# Patient Record
Sex: MALE | Race: OTHER | URBAN - METROPOLITAN AREA
[De-identification: names, ages, dates, MRNs, and addresses within clinical notes are randomized per-mention and may not be internally consistent; named-entity substitution may affect disease eponyms.]

---

## 2024-04-19 ENCOUNTER — OFFICE VISIT (OUTPATIENT)
Dept: URGENT CARE | Age: 49
End: 2024-04-19
Payer: COMMERCIAL

## 2024-04-19 VITALS
RESPIRATION RATE: 18 BRPM | BODY MASS INDEX: 39.4 KG/M2 | OXYGEN SATURATION: 97 % | SYSTOLIC BLOOD PRESSURE: 158 MMHG | DIASTOLIC BLOOD PRESSURE: 98 MMHG | TEMPERATURE: 98.2 F | HEIGHT: 64 IN | HEART RATE: 91 BPM | WEIGHT: 230.8 LBS

## 2024-04-19 DIAGNOSIS — M54.6 ACUTE RIGHT-SIDED THORACIC BACK PAIN: Primary | ICD-10-CM

## 2024-04-19 PROCEDURE — 99213 OFFICE O/P EST LOW 20 MIN: CPT | Performed by: EMERGENCY MEDICINE

## 2024-04-19 RX ORDER — METHOCARBAMOL 500 MG/1
500 TABLET, FILM COATED ORAL
Qty: 20 TABLET | Refills: 0 | Status: SHIPPED | OUTPATIENT
Start: 2024-04-19

## 2024-04-19 RX ORDER — SIMVASTATIN 20 MG
20 TABLET ORAL
COMMUNITY
Start: 2024-02-07 | End: 2024-04-25 | Stop reason: SDUPTHER

## 2024-04-19 RX ORDER — IBUPROFEN 600 MG/1
600 TABLET ORAL EVERY 6 HOURS PRN
Qty: 30 TABLET | Refills: 0 | Status: SHIPPED | OUTPATIENT
Start: 2024-04-19

## 2024-04-19 RX ORDER — LISINOPRIL 40 MG/1
40 TABLET ORAL DAILY
COMMUNITY
End: 2024-04-25 | Stop reason: SDUPTHER

## 2024-04-19 RX ORDER — HYDROCHLOROTHIAZIDE 25 MG/1
25 TABLET ORAL DAILY
COMMUNITY
End: 2024-04-25 | Stop reason: SDUPTHER

## 2024-04-19 RX ORDER — LIDOCAINE 50 MG/G
1 PATCH TOPICAL DAILY
Qty: 10 PATCH | Refills: 0 | Status: SHIPPED | OUTPATIENT
Start: 2024-04-19

## 2024-04-19 NOTE — PROGRESS NOTES
"[facility]        NAME: Michell Landrum is a 49 y.o. male  : 1975    MRN: [id number]  DATE: 2024  TIME: 11:50 AM    /98   Pulse 91   Temp 98.2 °F (36.8 °C) (Tympanic)   Resp 18   Ht 5' 4\" (1.626 m)   Wt 105 kg (230 lb 12.8 oz)   SpO2 97%   BMI 39.62 kg/m²     Assessment and Plan   Acute right-sided thoracic back pain [M54.6]  1. Acute right-sided thoracic back pain  ibuprofen (MOTRIN) 600 mg tablet    methocarbamol (ROBAXIN) 500 mg tablet    lidocaine (Lidoderm) 5 %            Patient Instructions       Follow up with PCP in 3-5 days.  Proceed to  ER if symptoms worsen.    Chief Complaint     Chief Complaint   Patient presents with    Back Pain     Patient reports that he woke up 5 days ago to right side middle back pain, states that it is getting worse.          History of Present Illness       Pt with mid right back pain for 4-5 days     Back Pain        Review of Systems   Review of Systems   Constitutional: Negative.    HENT: Negative.     Eyes: Negative.    Respiratory: Negative.     Cardiovascular: Negative.    Gastrointestinal: Negative.    Endocrine: Negative.    Genitourinary: Negative.    Musculoskeletal:  Positive for back pain.   Skin: Negative.    Allergic/Immunologic: Negative.    Neurological: Negative.    Hematological: Negative.    Psychiatric/Behavioral: Negative.     All other systems reviewed and are negative.        Current Medications       Current Outpatient Medications:     hydroCHLOROthiazide 25 mg tablet, Take 25 mg by mouth daily, Disp: , Rfl:     ibuprofen (MOTRIN) 600 mg tablet, Take 1 tablet (600 mg total) by mouth every 6 (six) hours as needed for mild pain, Disp: 30 tablet, Rfl: 0    lidocaine (Lidoderm) 5 %, Apply 1 patch topically over 12 hours daily Remove & Discard patch within 12 hours or as directed by MD, Disp: 10 patch, Rfl: 0    lisinopril (ZESTRIL) 40 mg tablet, Take 40 mg by mouth daily, Disp: , Rfl:     methocarbamol (ROBAXIN) 500 mg " "tablet, Take 1 tablet (500 mg total) by mouth daily at bedtime, Disp: 20 tablet, Rfl: 0    simvastatin (ZOCOR) 20 mg tablet, Take 20 mg by mouth daily at bedtime, Disp: , Rfl:     Current Allergies     Allergies as of 04/19/2024    (No Known Allergies)            The following portions of the patient's history were reviewed and updated as appropriate: allergies, current medications, past family history, past medical history, past social history, past surgical history and problem list.     No past medical history on file.    No past surgical history on file.    No family history on file.      Medications have been verified.        Objective   /98   Pulse 91   Temp 98.2 °F (36.8 °C) (Tympanic)   Resp 18   Ht 5' 4\" (1.626 m)   Wt 105 kg (230 lb 12.8 oz)   SpO2 97%   BMI 39.62 kg/m²        Physical Exam     Physical Exam  Vitals and nursing note reviewed.   Constitutional:       Appearance: Normal appearance. He is normal weight.      Comments: Pain worse with moving and deep breath and twisting  list of [facility] doctors given   HENT:      Head: Normocephalic and atraumatic.   Cardiovascular:      Rate and Rhythm: Normal rate and regular rhythm.      Pulses: Normal pulses.      Heart sounds: Normal heart sounds.   Pulmonary:      Effort: Pulmonary effort is normal.      Breath sounds: Normal breath sounds.   Abdominal:      General: Abdomen is flat.      Palpations: Abdomen is soft.      Comments: No ruq no flank pain    Musculoskeletal:         General: Normal range of motion.      Cervical back: Normal range of motion and neck supple.      Comments: Right para thoracic tenderness no vertebral pain no cva pain no trapezius pain    Skin:     General: Skin is warm.      Capillary Refill: Capillary refill takes less than 2 seconds.   Neurological:      Mental Status: He is alert and oriented to person, place, and time.                     "

## 2024-04-19 NOTE — LETTER
April 19, 2024     Patient: Michell Landrum   YOB: 1975   Date of Visit: 4/19/2024       To Whom It May Concern:  4  It is my medical opinion that Michell Landrum may return to work on 4/22/2024 .    If you have any questions or concerns, please don't hesitate to call.         Sincerely,        Piter Snyder Jr, MICKY    CC: No Recipients

## 2024-04-25 ENCOUNTER — OFFICE VISIT (OUTPATIENT)
Dept: FAMILY MEDICINE CLINIC | Facility: CLINIC | Age: 49
End: 2024-04-25
Payer: COMMERCIAL

## 2024-04-25 VITALS
TEMPERATURE: 98.2 F | OXYGEN SATURATION: 97 % | HEART RATE: 90 BPM | DIASTOLIC BLOOD PRESSURE: 84 MMHG | SYSTOLIC BLOOD PRESSURE: 148 MMHG | RESPIRATION RATE: 16 BRPM | WEIGHT: 230 LBS | BODY MASS INDEX: 39.48 KG/M2

## 2024-04-25 DIAGNOSIS — E78.5 HYPERLIPIDEMIA, UNSPECIFIED HYPERLIPIDEMIA TYPE: ICD-10-CM

## 2024-04-25 DIAGNOSIS — Z11.59 NEED FOR HEPATITIS C SCREENING TEST: ICD-10-CM

## 2024-04-25 DIAGNOSIS — Z12.5 PROSTATE CANCER SCREENING: ICD-10-CM

## 2024-04-25 DIAGNOSIS — Z23 ENCOUNTER FOR IMMUNIZATION: Primary | ICD-10-CM

## 2024-04-25 DIAGNOSIS — Z12.11 COLON CANCER SCREENING: ICD-10-CM

## 2024-04-25 DIAGNOSIS — M77.11 LATERAL EPICONDYLITIS OF RIGHT ELBOW: ICD-10-CM

## 2024-04-25 DIAGNOSIS — I10 PRIMARY HYPERTENSION: ICD-10-CM

## 2024-04-25 DIAGNOSIS — Z00.00 ANNUAL PHYSICAL EXAM: ICD-10-CM

## 2024-04-25 DIAGNOSIS — Z11.4 SCREENING FOR HIV (HUMAN IMMUNODEFICIENCY VIRUS): ICD-10-CM

## 2024-04-25 DIAGNOSIS — H40.9 GLAUCOMA, UNSPECIFIED GLAUCOMA TYPE, UNSPECIFIED LATERALITY: ICD-10-CM

## 2024-04-25 PROCEDURE — 90750 HZV VACC RECOMBINANT IM: CPT

## 2024-04-25 PROCEDURE — 99386 PREV VISIT NEW AGE 40-64: CPT | Performed by: FAMILY MEDICINE

## 2024-04-25 PROCEDURE — 90471 IMMUNIZATION ADMIN: CPT

## 2024-04-25 RX ORDER — HYDROCHLOROTHIAZIDE 25 MG/1
25 TABLET ORAL DAILY
Qty: 90 TABLET | Refills: 1 | Status: SHIPPED | OUTPATIENT
Start: 2024-04-25

## 2024-04-25 RX ORDER — AMLODIPINE BESYLATE 5 MG/1
5 TABLET ORAL DAILY
Qty: 90 TABLET | Refills: 1 | Status: SHIPPED | OUTPATIENT
Start: 2024-04-25

## 2024-04-25 RX ORDER — NETARSUDIL 0.2 MG/ML
1 SOLUTION/ DROPS OPHTHALMIC; TOPICAL DAILY
COMMUNITY
Start: 2023-09-12 | End: 2024-09-11

## 2024-04-25 RX ORDER — LISINOPRIL 40 MG/1
40 TABLET ORAL DAILY
Qty: 90 TABLET | Refills: 1 | Status: SHIPPED | OUTPATIENT
Start: 2024-04-25

## 2024-04-25 RX ORDER — SIMVASTATIN 20 MG
20 TABLET ORAL
Qty: 90 TABLET | Refills: 1 | Status: SHIPPED | OUTPATIENT
Start: 2024-04-25

## 2024-04-25 NOTE — PROGRESS NOTES
ADULT ANNUAL PHYSICAL  [facility]    NAME: Michell Landrum  AGE: 49 y.o. SEX: male  : 1975     DATE: 2024     Assessment and Plan:     Problem List Items Addressed This Visit          Cardiovascular and Mediastinum    Primary hypertension     Elevated on exam today, add amlodipine follow up 3 months, call with home results         Relevant Medications    lisinopril (ZESTRIL) 40 mg tablet    hydroCHLOROthiazide 25 mg tablet    amLODIPine (NORVASC) 5 mg tablet       Musculoskeletal and Integument    Lateral epicondylitis of right elbow     Given home exercises, follow up if not resolved            Eye    Glaucoma    Relevant Medications    Netarsudil Dimesylate (Rhopressa) 0.02 % SOLN    Other Relevant Orders    Ambulatory Referral to Ophthalmology       Other    Colon cancer screening     Fit testing reviewed, discussed screening options proceed with colonoscopy referall placed.         Relevant Orders    Ambulatory Referral to Gastroenterology     Other Visit Diagnoses       Encounter for immunization    -  Primary    Relevant Orders    Zoster Vaccine Recombinant IM (Completed)    Annual physical exam        Relevant Orders    Comprehensive metabolic panel    Lipid panel    Hemoglobin A1C    Need for hepatitis C screening test        Relevant Orders    Hepatitis C Antibody    Screening for HIV (human immunodeficiency virus)        Relevant Orders    HIV 1/2 AG/AB w Reflex [facility] for 2 yr old and above    Hyperlipidemia, unspecified hyperlipidemia type        Relevant Medications    simvastatin (ZOCOR) 20 mg tablet    Prostate cancer screening        Relevant Orders    PSA, Total Screen            Immunizations and preventive care screenings were discussed with patient today. Appropriate education was printed on patient's after visit summary.    Discussed risks and benefits of prostate cancer screening. We discussed the controversial history of  PSA screening for prostate cancer in the [place] as well as the risk of over detection and over treatment of prostate cancer by way of PSA screening.  The patient understands that PSA blood testing is an imperfect way to screen for prostate cancer and that elevated PSA levels in the blood may also be caused by infection, inflammation, prostatic trauma or manipulation, urological procedures, or by benign prostatic enlargement.    The role of the digital rectal examination in prostate cancer screening was also discussed and I discussed with him that there is large interobserver variability in the findings of digital rectal examination.    Counseling:  Alcohol/drug use: discussed moderation in alcohol intake, the recommendations for healthy alcohol use, and avoidance of illicit drug use.  Dental Health: discussed importance of regular tooth brushing, flossing, and dental visits.  Injury prevention: discussed safety/seat belts, safety helmets, smoke detectors, carbon dioxide detectors, and smoking near bedding or upholstery.  Sexual health: discussed sexually transmitted diseases, partner selection, use of condoms, avoidance of unintended pregnancy, and contraceptive alternatives.  Exercise: the importance of regular exercise/physical activity was discussed. Recommend exercise 3-5 times per week for at least 30 minutes.          Return in about 3 months (around 7/25/2024).     Chief Complaint:     Chief Complaint   Patient presents with    Physical Exam    Establish Care      History of Present Illness:     Adult Annual Physical   Patient here for a comprehensive physical exam. The patient reports problems - as above .    Diet and Physical Activity  Diet/Nutrition: well balanced diet.   Exercise: walking.      Depression Screening  PHQ-2/9 Depression Screening    Little interest or pleasure in doing things: 0 - not at all  Feeling down, depressed, or hopeless: 0 - not at all  Trouble falling or staying asleep, or  sleeping too much: 0 - not at all  Feeling tired or having little energy: 0 - not at all  Poor appetite or overeatin - not at all  Feeling bad about yourself - or that you are a failure or have let yourself or your family down: 0 - not at all  Trouble concentrating on things, such as reading the newspaper or watching television: 0 - not at all  Moving or speaking so slowly that other people could have noticed. Or the opposite - being so fidgety or restless that you have been moving around a lot more than usual: 0 - not at all  Thoughts that you would be better off dead, or of hurting yourself in some way: 0 - not at all  PHQ-2 Score: 0  PHQ-2 Interpretation: Negative depression screen  PHQ-9 Score: 0  PHQ-9 Interpretation: No or Minimal depression       General Health  Sleep: sleeps well.   Hearing: normal - bilateral.  Vision: no vision problems.   Dental: regular dental visits.        Health  Symptoms include: none     Review of Systems:     Review of Systems   Constitutional:  Negative for activity change.   Respiratory:  Negative for apnea and chest tightness.    Cardiovascular:  Negative for chest pain and palpitations.   Gastrointestinal:  Negative for abdominal distention and abdominal pain.   Musculoskeletal:  Negative for arthralgias and back pain.      Past Medical History:     Past Medical History:   Diagnosis Date    Glaucoma       Past Surgical History:     History reviewed. No pertinent surgical history.   Family History:     Family History   Problem Relation Age of Onset    Diabetes Mother     Diabetes Maternal Grandmother       Social History:     Social History     Socioeconomic History    Marital status: /Civil Union     Spouse name: None    Number of children: None    Years of education: None    Highest education level: None   Occupational History    None   Tobacco Use    Smoking status: Never    Smokeless tobacco: Never   Vaping Use    Vaping status: Never Used   Substance and Sexual  Activity    Alcohol use: Never    Drug use: Never    Sexual activity: None   Other Topics Concern    None   Social History Narrative    None     Social Determinants of Health     Financial Resource Strain: Not on file   Food Insecurity: Not on file   Transportation Needs: Not on file   Physical Activity: Not on file   Stress: Not on file   Social Connections: Not on file   Intimate Partner Violence: Not on file   Housing Stability: Not on file      Current Medications:     Current Outpatient Medications   Medication Sig Dispense Refill    amLODIPine (NORVASC) 5 mg tablet Take 1 tablet (5 mg total) by mouth daily 90 tablet 1    hydroCHLOROthiazide 25 mg tablet Take 1 tablet (25 mg total) by mouth daily 90 tablet 1    ibuprofen (MOTRIN) 600 mg tablet Take 1 tablet (600 mg total) by mouth every 6 (six) hours as needed for mild pain 30 tablet 0    lidocaine (Lidoderm) 5 % Apply 1 patch topically over 12 hours daily Remove & Discard patch within 12 hours or as directed by MD 10 patch 0    lisinopril (ZESTRIL) 40 mg tablet Take 1 tablet (40 mg total) by mouth daily 90 tablet 1    methocarbamol (ROBAXIN) 500 mg tablet Take 1 tablet (500 mg total) by mouth daily at bedtime 20 tablet 0    Netarsudil Dimesylate (Rhopressa) 0.02 % SOLN Apply 1 drop to eye daily      simvastatin (ZOCOR) 20 mg tablet Take 1 tablet (20 mg total) by mouth daily at bedtime 90 tablet 1     No current facility-administered medications for this visit.      Allergies:     Allergies   Allergen Reactions    Gramineae Pollens Cough      Physical Exam:     /84 (BP Location: Left arm, Patient Position: Sitting, Cuff Size: Large)   Pulse 90   Temp 98.2 °F (36.8 °C) (Tympanic)   Resp 16   Wt 104 kg (230 lb)   SpO2 97%   BMI 39.48 kg/m²     Physical Exam  Constitutional:       Appearance: Normal appearance.   Cardiovascular:      Rate and Rhythm: Normal rate and regular rhythm.      Pulses: Normal pulses.      Heart sounds: Normal heart sounds.    Pulmonary:      Effort: Pulmonary effort is normal.      Breath sounds: Normal breath sounds.   Abdominal:      General: Abdomen is flat.   Musculoskeletal:         General: Normal range of motion.   Neurological:      General: No focal deficit present.      Mental Status: He is alert and oriented to person, place, and time.          Bobby Stockton MD  [facility]

## 2024-04-26 ENCOUNTER — APPOINTMENT (OUTPATIENT)
Dept: LAB | Facility: MEDICAL CENTER | Age: 49
End: 2024-04-26
Payer: COMMERCIAL

## 2024-04-26 DIAGNOSIS — Z12.5 PROSTATE CANCER SCREENING: ICD-10-CM

## 2024-04-26 DIAGNOSIS — Z11.59 NEED FOR HEPATITIS C SCREENING TEST: ICD-10-CM

## 2024-04-26 DIAGNOSIS — Z00.8 HEALTH EXAMINATION IN POPULATION SURVEY: ICD-10-CM

## 2024-04-26 DIAGNOSIS — Z11.4 SCREENING FOR HIV (HUMAN IMMUNODEFICIENCY VIRUS): ICD-10-CM

## 2024-04-26 DIAGNOSIS — Z00.00 ANNUAL PHYSICAL EXAM: ICD-10-CM

## 2024-04-26 LAB
ALBUMIN SERPL BCP-MCNC: 4.3 G/DL (ref 3.5–5)
ALP SERPL-CCNC: 43 U/L (ref 34–104)
ALT SERPL W P-5'-P-CCNC: 38 U/L (ref 7–52)
ANION GAP SERPL CALCULATED.3IONS-SCNC: 7 MMOL/L (ref 4–13)
AST SERPL W P-5'-P-CCNC: 22 U/L (ref 13–39)
BILIRUB SERPL-MCNC: 0.6 MG/DL (ref 0.2–1)
BUN SERPL-MCNC: 20 MG/DL (ref 5–25)
CALCIUM SERPL-MCNC: 9.3 MG/DL (ref 8.4–10.2)
CHLORIDE SERPL-SCNC: 103 MMOL/L (ref 96–108)
CHOLEST SERPL-MCNC: 139 MG/DL
CO2 SERPL-SCNC: 31 MMOL/L (ref 21–32)
CREAT SERPL-MCNC: 1 MG/DL (ref 0.6–1.3)
EST. AVERAGE GLUCOSE BLD GHB EST-MCNC: 123 MG/DL
GFR SERPL CREATININE-BSD FRML MDRD: 86 ML/MIN/1.73SQ M
GLUCOSE P FAST SERPL-MCNC: 95 MG/DL (ref 65–99)
HBA1C MFR BLD: 5.9 %
HCV AB SER QL: NORMAL
HDLC SERPL-MCNC: 46 MG/DL
HIV 1+2 AB+HIV1 P24 AG SERPL QL IA: NORMAL
HIV 2 AB SERPL QL IA: NORMAL
HIV1 AB SERPL QL IA: NORMAL
HIV1 P24 AG SERPL QL IA: NORMAL
LDLC SERPL CALC-MCNC: 77 MG/DL (ref 0–100)
NONHDLC SERPL-MCNC: 93 MG/DL
POTASSIUM SERPL-SCNC: 3.8 MMOL/L (ref 3.5–5.3)
PROT SERPL-MCNC: 7.2 G/DL (ref 6.4–8.4)
PSA SERPL-MCNC: 0.4 NG/ML (ref 0–4)
SODIUM SERPL-SCNC: 141 MMOL/L (ref 135–147)
TRIGL SERPL-MCNC: 81 MG/DL

## 2024-04-26 PROCEDURE — 83036 HEMOGLOBIN GLYCOSYLATED A1C: CPT

## 2024-04-26 PROCEDURE — 80053 COMPREHEN METABOLIC PANEL: CPT

## 2024-04-26 PROCEDURE — 87389 HIV-1 AG W/HIV-1&-2 AB AG IA: CPT

## 2024-04-26 PROCEDURE — 86803 HEPATITIS C AB TEST: CPT

## 2024-04-26 PROCEDURE — 36415 COLL VENOUS BLD VENIPUNCTURE: CPT

## 2024-04-26 PROCEDURE — 80061 LIPID PANEL: CPT

## 2024-04-26 PROCEDURE — G0103 PSA SCREENING: HCPCS

## 2024-05-12 RX ORDER — NETARSUDIL 0.2 MG/ML
1 SOLUTION/ DROPS OPHTHALMIC; TOPICAL DAILY
Qty: 1.5 ML | Refills: 0 | Status: CANCELLED | OUTPATIENT
Start: 2024-05-12 | End: 2025-05-12

## 2024-05-13 ENCOUNTER — TELEPHONE (OUTPATIENT)
Dept: FAMILY MEDICINE CLINIC | Facility: CLINIC | Age: 49
End: 2024-05-13

## 2024-05-13 DIAGNOSIS — H40.10X0 OPEN-ANGLE GLAUCOMA OF RIGHT EYE, UNSPECIFIED GLAUCOMA STAGE, UNSPECIFIED OPEN-ANGLE GLAUCOMA TYPE: Primary | ICD-10-CM

## 2024-05-13 NOTE — TELEPHONE ENCOUNTER
Medication Refill Request     Name   Netarsudil Dimesylate (Rhopressa) 0.02 % SOLN     Dose/Frequency Apply 1 drop to eye daily   Quantity --  Verified pharmacy   [x]  Verified ordering Provider   [x]  Does patient have enough for the next 3 days? Yes [] No [x]

## 2024-05-13 NOTE — TELEPHONE ENCOUNTER
I called pt l/m to advise him to call ophthalmologist for a refill on his medication. He is to call with questions or concerns.

## 2024-05-13 NOTE — TELEPHONE ENCOUNTER
I called pt l/m per Dr. Stockton see below.       Please call patient and have him call his opthalmologist for a refill on Netarsudil Dimesylate 0.02 % SOLN.

## 2024-05-14 RX ORDER — NETARSUDIL 0.2 MG/ML
1 SOLUTION/ DROPS OPHTHALMIC; TOPICAL DAILY
Qty: 1.5 ML | Refills: 5 | Status: SHIPPED | OUTPATIENT
Start: 2024-05-14 | End: 2025-05-14

## 2024-05-17 ENCOUNTER — TELEPHONE (OUTPATIENT)
Dept: ADMINISTRATIVE | Facility: OTHER | Age: 49
End: 2024-05-17

## 2024-05-17 NOTE — TELEPHONE ENCOUNTER
Upon review of the In Basket request we were able to locate, review, and update the patient chart as requested for CRC: FIT/FOBT (3).    Any additional questions or concerns should be emailed to the Practice Liaisons via the appropriate education email address, please do not reply via In Basket.    Thank you  Charlene Kendrick

## 2024-05-17 NOTE — TELEPHONE ENCOUNTER
----- Message from Nica VALENTINE sent at 5/17/2024 11:21 AM EDT -----  Regarding: care gap request  05/17/24 11:21 AM    Hello, our patient attached above has had CRC: FIT/FOBTx3 completed/performed. Please assist in updating the patient chart by pulling the Care Everywhere (CE) document. The date of service is 6/5/2023.     Thank you,  Nica Brewster PG [facility]

## 2024-05-25 PROBLEM — Z12.11 COLON CANCER SCREENING: Status: RESOLVED | Noted: 2024-04-25 | Resolved: 2024-05-25

## 2024-05-28 ENCOUNTER — TELEPHONE (OUTPATIENT)
Dept: GASTROENTEROLOGY | Facility: CLINIC | Age: 49
End: 2024-05-28

## 2024-05-28 ENCOUNTER — PREP FOR PROCEDURE (OUTPATIENT)
Dept: GASTROENTEROLOGY | Facility: CLINIC | Age: 49
End: 2024-05-28

## 2024-05-28 DIAGNOSIS — Z12.11 SCREENING FOR COLON CANCER: Primary | ICD-10-CM

## 2024-05-28 NOTE — TELEPHONE ENCOUNTER
Scheduled date of colonoscopy (as of today): 07/02/24  Physician performing colonoscopy: lila  Location of colonoscopy: [facility]  Bowel prep reviewed with patient: m/d  Instructions reviewed with patient by: edelmira  Clearances: none

## 2024-05-28 NOTE — TELEPHONE ENCOUNTER
05/28/24  Screened by: Eun Hennessy MA    Referring Provider oa    Pre- Screening:     There is no height or weight on file to calculate BMI.  Has patient been referred for a routine screening Colonoscopy? no  Is the patient between 45-75 years old? no      Previous Colonoscopy no   If yes:    Date:     Facility:     Reason:       SCHEDULING STAFF: If the patient is between 45yrs-49yrs, please advise patient to confirm benefits/coverage with their insurance company for a routine screening colonoscopy, some insurance carriers will only cover at 50yrs or older. If the patient is over 75years old, please schedule an office visit.     Does the patient want to see a Gastroenterologist prior to their procedure OR are they having any GI symptoms?     Has the patient been hospitalized or had abdominal surgery in the past 6 months? no    Does the patient use supplemental oxygen? no    Does the patient take Coumadin, Lovenox, Plavix, Elliquis, Xarelto, or other blood thinning medication? no    Has the patient had a stroke, cardiac event, or stent placed in the past year? no    SCHEDULING STAFF: If patient answers NO to above questions, then schedule procedure. If patient answers YES to above questions, then schedule office appointment.     If patient is between 45yrs - 49yrs, please advise patient that we will have to confirm benefits & coverage with their insurance company for a routine screening colonoscopy.

## 2024-06-17 ENCOUNTER — ANESTHESIA EVENT (OUTPATIENT)
Dept: ANESTHESIOLOGY | Facility: HOSPITAL | Age: 49
End: 2024-06-17

## 2024-06-17 ENCOUNTER — ANESTHESIA (OUTPATIENT)
Dept: ANESTHESIOLOGY | Facility: HOSPITAL | Age: 49
End: 2024-06-17

## 2024-07-02 ENCOUNTER — ANESTHESIA EVENT (OUTPATIENT)
Dept: GASTROENTEROLOGY | Facility: MEDICAL CENTER | Age: 49
End: 2024-07-02

## 2024-07-02 ENCOUNTER — ANESTHESIA (OUTPATIENT)
Dept: GASTROENTEROLOGY | Facility: MEDICAL CENTER | Age: 49
End: 2024-07-02

## 2024-07-02 ENCOUNTER — HOSPITAL ENCOUNTER (OUTPATIENT)
Dept: GASTROENTEROLOGY | Facility: MEDICAL CENTER | Age: 49
Setting detail: OUTPATIENT SURGERY
Discharge: HOME | End: 2024-07-02
Payer: COMMERCIAL

## 2024-07-02 VITALS
WEIGHT: 230 LBS | RESPIRATION RATE: 18 BRPM | DIASTOLIC BLOOD PRESSURE: 57 MMHG | BODY MASS INDEX: 39.27 KG/M2 | SYSTOLIC BLOOD PRESSURE: 127 MMHG | OXYGEN SATURATION: 95 % | TEMPERATURE: 98.2 F | HEIGHT: 64 IN | HEART RATE: 88 BPM

## 2024-07-02 DIAGNOSIS — Z12.11 SCREENING FOR COLON CANCER: ICD-10-CM

## 2024-07-02 PROCEDURE — 45385 COLONOSCOPY W/LESION REMOVAL: CPT | Performed by: STUDENT IN AN ORGANIZED HEALTH CARE EDUCATION/TRAINING PROGRAM

## 2024-07-02 PROCEDURE — 88305 TISSUE EXAM BY PATHOLOGIST: CPT | Performed by: PATHOLOGY

## 2024-07-02 RX ORDER — SODIUM CHLORIDE 9 MG/ML
75 INJECTION, SOLUTION INTRAVENOUS CONTINUOUS
Status: DISCONTINUED | OUTPATIENT
Start: 2024-07-02 | End: 2024-07-06 | Stop reason: HOSPADM

## 2024-07-02 RX ORDER — SODIUM CHLORIDE 9 MG/ML
75 INJECTION, SOLUTION INTRAVENOUS CONTINUOUS
Status: CANCELLED | OUTPATIENT
Start: 2024-07-02

## 2024-07-02 RX ORDER — SIMETHICONE 40MG/0.6ML
SUSPENSION, DROPS(FINAL DOSAGE FORM)(ML) ORAL AS NEEDED
Status: COMPLETED | OUTPATIENT
Start: 2024-07-02 | End: 2024-07-02

## 2024-07-02 RX ORDER — PROPOFOL 10 MG/ML
INJECTION, EMULSION INTRAVENOUS AS NEEDED
Status: DISCONTINUED | OUTPATIENT
Start: 2024-07-02 | End: 2024-07-02

## 2024-07-02 RX ADMIN — PROPOFOL 20 MG: 10 INJECTION, EMULSION INTRAVENOUS at 10:42

## 2024-07-02 RX ADMIN — PROPOFOL 20 MG: 10 INJECTION, EMULSION INTRAVENOUS at 10:39

## 2024-07-02 RX ADMIN — PROPOFOL 80 MG: 10 INJECTION, EMULSION INTRAVENOUS at 10:26

## 2024-07-02 RX ADMIN — SODIUM CHLORIDE 75 ML/HR: 0.9 INJECTION, SOLUTION INTRAVENOUS at 10:14

## 2024-07-02 RX ADMIN — PROPOFOL 20 MG: 10 INJECTION, EMULSION INTRAVENOUS at 10:30

## 2024-07-02 RX ADMIN — PROPOFOL 20 MG: 10 INJECTION, EMULSION INTRAVENOUS at 10:36

## 2024-07-02 RX ADMIN — PROPOFOL 20 MG: 10 INJECTION, EMULSION INTRAVENOUS at 10:40

## 2024-07-02 RX ADMIN — Medication 40 MG: at 10:29

## 2024-07-02 RX ADMIN — PROPOFOL 20 MG: 10 INJECTION, EMULSION INTRAVENOUS at 10:44

## 2024-07-02 RX ADMIN — PROPOFOL 20 MG: 10 INJECTION, EMULSION INTRAVENOUS at 10:32

## 2024-07-02 RX ADMIN — PROPOFOL 20 MG: 10 INJECTION, EMULSION INTRAVENOUS at 10:29

## 2024-07-02 NOTE — H&P
History and Physical - [facility]  Michell Landrum 49 y.o. male MRN: [id number]          HPI: Michell Landrum is a 49 y.o. year old male who presents for open access screening colonoscopy. No family history of colon cancer..      REVIEW OF SYSTEMS: Per the HPI, and otherwise unremarkable.    Historical Information   Past Medical History:   Diagnosis Date    Glaucoma      No past surgical history on file.  Social History   Social History     Substance and Sexual Activity   Alcohol Use Never     Social History     Substance and Sexual Activity   Drug Use Never     Social History     Tobacco Use   Smoking Status Never   Smokeless Tobacco Never     Family History   Problem Relation Age of Onset    Diabetes Mother     Diabetes Maternal Grandmother        Meds/Allergies       Current Outpatient Medications:     amLODIPine (NORVASC) 5 mg tablet    hydroCHLOROthiazide 25 mg tablet    ibuprofen (MOTRIN) 600 mg tablet    lidocaine (Lidoderm) 5 %    lisinopril (ZESTRIL) 40 mg tablet    methocarbamol (ROBAXIN) 500 mg tablet    Netarsudil Dimesylate (Rhopressa) 0.02 % SOLN    simvastatin (ZOCOR) 20 mg tablet    Allergies   Allergen Reactions    Gramineae Pollens Cough       Objective     There were no vitals taken for this visit.      PHYSICAL EXAM    GEN: NAD  CARDIO: RRR  PULM: CTA bilaterally  ABD: soft, non-tender, non-distended  EXT: no lower extremity edema  NEURO: AAOx3      ASSESSMENT/PLAN:  49 y.o. year old male here for colonoscopy; he is stable and optimized for his procedure.

## 2024-07-02 NOTE — ANESTHESIA PREPROCEDURE EVALUATION
Procedure:  COLONOSCOPY    Relevant Problems   CARDIO   (+) Primary hypertension      MUSCULOSKELETAL   (+) Lateral epicondylitis of right elbow        Physical Exam    Airway    Mallampati score: III  TM Distance: >3 FB  Neck ROM: full     Dental   No notable dental hx     Cardiovascular  Cardiovascular exam normal    Pulmonary  Pulmonary exam normal     Other Findings        Anesthesia Plan  ASA Score- 2     Anesthesia Type- IV sedation with anesthesia with ASA Monitors.         Additional Monitors:     Airway Plan:            Plan Factors-Exercise tolerance (METS): >4 METS.    Chart reviewed.    Patient summary reviewed.    Patient is not a current smoker.              Induction- intravenous.    Postoperative Plan-         Informed Consent- Anesthetic plan and risks discussed with patient.

## 2024-07-02 NOTE — ANESTHESIA POSTPROCEDURE EVALUATION
Post-Op Assessment Note    CV Status:  Stable    Pain management: adequate       Mental Status:  Sleepy   Hydration Status:  Euvolemic   PONV Controlled:  Controlled   Airway Patency:  Patent     Post Op Vitals Reviewed: Yes    No anethesia notable event occurred.    Staff: CRNA               BP   139/74   Temp   97.6   Pulse  72   Resp   18   SpO2   99

## 2024-07-05 PROCEDURE — 88305 TISSUE EXAM BY PATHOLOGIST: CPT | Performed by: PATHOLOGY

## 2024-07-25 ENCOUNTER — OFFICE VISIT (OUTPATIENT)
Dept: FAMILY MEDICINE CLINIC | Facility: CLINIC | Age: 49
End: 2024-07-25
Payer: COMMERCIAL

## 2024-07-25 VITALS
SYSTOLIC BLOOD PRESSURE: 128 MMHG | TEMPERATURE: 97.9 F | DIASTOLIC BLOOD PRESSURE: 78 MMHG | OXYGEN SATURATION: 97 % | BODY MASS INDEX: 39.48 KG/M2 | WEIGHT: 230 LBS | HEART RATE: 97 BPM

## 2024-07-25 DIAGNOSIS — I10 PRIMARY HYPERTENSION: ICD-10-CM

## 2024-07-25 DIAGNOSIS — M77.11 LATERAL EPICONDYLITIS OF RIGHT ELBOW: ICD-10-CM

## 2024-07-25 DIAGNOSIS — M79.641 PAIN OF RIGHT HAND: Primary | ICD-10-CM

## 2024-07-25 PROCEDURE — 99213 OFFICE O/P EST LOW 20 MIN: CPT | Performed by: FAMILY MEDICINE

## 2024-07-25 NOTE — TELEPHONE ENCOUNTER
Lvm in Nepali letting patient know that his appointment is in [address]  in [place] and not at [facility]. Advice patient to call if he had any questions

## 2024-07-25 NOTE — PROGRESS NOTES
Assessment/Plan:       Problem List Items Addressed This Visit          Cardiovascular and Mediastinum    Primary hypertension     Well controlled, did not take amldipine, continue on hctz and lisinorpil         Relevant Orders    Basic metabolic panel       Musculoskeletal and Integument    Lateral epicondylitis of right elbow     Not imroving with home exercises, working as , refer to orthopedics for further evaluation         Relevant Orders    Ambulatory Referral to Orthopedic Surgery       Surgery/Wound/Pain    Pain of right hand - Primary     Tender in center of hand with no palpable abnormality, refer to orthopedics and check xray         Relevant Orders    XR hand 3+ vw right         Subjective:      Patient ID: Michell Landrum is a 49 y.o. male.    Arm Pain   Pertinent negatives include no chest pain.       49 year old male presnting for follow up of htn, and continued elbow pain.    HTN now well controled.    Elbow pain for last 6 motnhs not improving.    Also having tenderness in center of right hand    The following portions of the patient's history were reviewed and updated as appropriate: allergies, current medications, past family history, past medical history, past social history, past surgical history and problem list.      Current Outpatient Medications:     hydroCHLOROthiazide 25 mg tablet, Take 1 tablet (25 mg total) by mouth daily, Disp: 90 tablet, Rfl: 1    ibuprofen (MOTRIN) 600 mg tablet, Take 1 tablet (600 mg total) by mouth every 6 (six) hours as needed for mild pain, Disp: 30 tablet, Rfl: 0    lidocaine (Lidoderm) 5 %, Apply 1 patch topically over 12 hours daily Remove & Discard patch within 12 hours or as directed by MD, Disp: 10 patch, Rfl: 0    lisinopril (ZESTRIL) 40 mg tablet, Take 1 tablet (40 mg total) by mouth daily, Disp: 90 tablet, Rfl: 1    methocarbamol (ROBAXIN) 500 mg tablet, Take 1 tablet (500 mg total) by mouth daily at bedtime, Disp: 20 tablet, Rfl: 0    Netarsudil  Dimesylate (Rhopressa) 0.02 % SOLN, Apply 1 drop to eye daily, Disp: 1.5 mL, Rfl: 5    simvastatin (ZOCOR) 20 mg tablet, Take 1 tablet (20 mg total) by mouth daily at bedtime, Disp: 90 tablet, Rfl: 1     Review of Systems   Constitutional:  Negative for activity change and appetite change.   Respiratory:  Negative for apnea and chest tightness.    Cardiovascular:  Negative for chest pain and palpitations.   Gastrointestinal:  Negative for abdominal distention and abdominal pain.   Musculoskeletal:  Negative for arthralgias and back pain.         Objective:      /78 (BP Location: Left arm, Cuff Size: Large)   Pulse 97   Temp 97.9 °F (36.6 °C) (Tympanic)   Wt 104 kg (230 lb)   SpO2 97%   BMI 39.48 kg/m²          Physical Exam  Constitutional:       Appearance: Normal appearance.   Cardiovascular:      Rate and Rhythm: Normal rate and regular rhythm.      Pulses: Normal pulses.      Heart sounds: Normal heart sounds.   Musculoskeletal:      Right elbow: Decreased range of motion. Tenderness present.      Left elbow: Normal.      Right hand: Tenderness present. Normal strength.        Arms:    Skin:     Capillary Refill: Capillary refill takes less than 2 seconds.   Neurological:      General: No focal deficit present.      Mental Status: He is alert and oriented to person, place, and time.           Bobby Stockton MD

## 2024-07-30 ENCOUNTER — APPOINTMENT (OUTPATIENT)
Dept: LAB | Facility: HOSPITAL | Age: 49
End: 2024-07-30

## 2024-07-30 DIAGNOSIS — Z00.8 ENCOUNTER FOR OTHER GENERAL EXAMINATION: ICD-10-CM

## 2024-07-30 LAB
CHOLEST SERPL-MCNC: 203 MG/DL
EST. AVERAGE GLUCOSE BLD GHB EST-MCNC: 126 MG/DL
HBA1C MFR BLD: 6 %
HDLC SERPL-MCNC: 47 MG/DL
LDLC SERPL CALC-MCNC: 110 MG/DL (ref 0–100)
NONHDLC SERPL-MCNC: 156 MG/DL
TRIGL SERPL-MCNC: 232 MG/DL

## 2024-07-30 PROCEDURE — 80061 LIPID PANEL: CPT

## 2024-07-30 PROCEDURE — 83036 HEMOGLOBIN GLYCOSYLATED A1C: CPT

## 2024-07-30 PROCEDURE — 36415 COLL VENOUS BLD VENIPUNCTURE: CPT

## 2024-08-01 ENCOUNTER — OFFICE VISIT (OUTPATIENT)
Dept: OBGYN CLINIC | Facility: CLINIC | Age: 49
End: 2024-08-01
Payer: COMMERCIAL

## 2024-08-01 VITALS
DIASTOLIC BLOOD PRESSURE: 80 MMHG | WEIGHT: 229.4 LBS | SYSTOLIC BLOOD PRESSURE: 142 MMHG | HEIGHT: 64 IN | BODY MASS INDEX: 39.16 KG/M2

## 2024-08-01 DIAGNOSIS — M77.11 LATERAL EPICONDYLITIS OF RIGHT ELBOW: ICD-10-CM

## 2024-08-01 DIAGNOSIS — M65.341 TRIGGER FINGER, RIGHT RING FINGER: Primary | ICD-10-CM

## 2024-08-01 PROCEDURE — 20551 NJX 1 TENDON ORIGIN/INSJ: CPT | Performed by: PHYSICIAN ASSISTANT

## 2024-08-01 PROCEDURE — 99203 OFFICE O/P NEW LOW 30 MIN: CPT | Performed by: PHYSICIAN ASSISTANT

## 2024-08-01 PROCEDURE — 20550 NJX 1 TENDON SHEATH/LIGAMENT: CPT | Performed by: PHYSICIAN ASSISTANT

## 2024-08-01 RX ORDER — BETAMETHASONE SODIUM PHOSPHATE AND BETAMETHASONE ACETATE 3; 3 MG/ML; MG/ML
6 INJECTION, SUSPENSION INTRA-ARTICULAR; INTRALESIONAL; INTRAMUSCULAR; SOFT TISSUE
Status: COMPLETED | OUTPATIENT
Start: 2024-08-01 | End: 2024-08-01

## 2024-08-01 RX ORDER — MELOXICAM 15 MG/1
15 TABLET ORAL DAILY
Qty: 30 TABLET | Refills: 0 | Status: SHIPPED | OUTPATIENT
Start: 2024-08-01

## 2024-08-01 RX ORDER — LIDOCAINE HYDROCHLORIDE 10 MG/ML
1 INJECTION, SOLUTION INFILTRATION; PERINEURAL
Status: COMPLETED | OUTPATIENT
Start: 2024-08-01 | End: 2024-08-01

## 2024-08-01 RX ADMIN — BETAMETHASONE SODIUM PHOSPHATE AND BETAMETHASONE ACETATE 6 MG: 3; 3 INJECTION, SUSPENSION INTRA-ARTICULAR; INTRALESIONAL; INTRAMUSCULAR; SOFT TISSUE at 11:00

## 2024-08-01 RX ADMIN — LIDOCAINE HYDROCHLORIDE 1 ML: 10 INJECTION, SOLUTION INFILTRATION; PERINEURAL at 11:00

## 2024-08-01 NOTE — PROGRESS NOTES
"Patient Name:  Michell Landrum  MRN:  [id number]    Assessment & Plan     Right elbow lateral epicondylitis.  Right ring finger pain, likely early onset trigger finger.  Patient was offered and accepted a right elbow common extensor origin corticosteroid injection today.  Patient was also offered and accepted a right ring finger A1 pulley corticosteroid injection today.  Prescription for meloxicam.  Referral to physical therapy.  Recommend over-the-counter tennis elbow strap.  Follow-up in 3 months for repeat evaluation.  Advised patient to contact the office if he does not notice any significant improvement with the ring finger corticosteroid injection.  At that time he will be referred to one of our hand specialists.    Chief Complaint     Right elbow pain, right hand pain.    History of the Present Illness     Michell Landrum is a 49 y.o. right-hand-dominant male who reports to the office today for evaluation of his right elbow and right hand.  He notes an onset of pain approximately 6 months ago.  He denies any injury or trauma but states the pain began shortly after starting a job as a .  He notes pain in the lateral elbow without any radiation distally.  Pain is worse with repetitive use and lifting.  He notes weakness due to the pain.  He denies any instability or numbness and tingling.  He also notes pain in the right hand.  Pain is localized to the volar base of the ring finger.  Pain is worse with grasping objects.  He notes occasional clicking and popping  the digit with motion.  He denies any locking or triggering.  He currently takes nothing for pain.  He has not performed any physical therapy.    Physical Exam     /80 (BP Location: Right arm, Patient Position: Sitting, Cuff Size: Standard)   Ht 5' 4\" (1.626 m)   Wt 104 kg (229 lb 6.4 oz)   BMI 39.38 kg/m²     Right elbow: No gross deformity.  Skin is intact without erythema ecchymosis or swelling.  There is significant tenderness over the " lateral epicondyle.  No tenderness medial epicondyle and olecranon as well as the radial head.  Full elbow flexion and extension without pain.  Full pronation and supination of the wrist without pain in the elbow.  Elbow stable to varus and valgus stress.  Severe pain in the lateral elbow with resisted wrist extension.  No pain in the elbow with resisted wrist flexion.    Right hand: There is a palpable nodule about the A1 pulley of the ring finger which is tender to palpation.  No tenderness about the remainder of the carpus and A1 pulleys.  Full wrist flexion and extension without pain.  Ring finger range of motion is intact and full with occasional clicking and popping.  No triggering or locking.  Full composite fist formation.  Sensation intact median ulnar and radial nerves.  2+ radial pulse.    Eyes: Anicteric sclerae.  ENT: Trachea midline.  Lungs: Normal respiratory effort.  CV: Capillary refill is less than 2 seconds.  Skin: Intact without erythema.  Lymph: No palpable lymphadenopathy.  Neuro: Sensation is grossly intact to light touch.  Psych: Mood and affect are appropriate.    Data Review     I have personally reviewed pertinent films in PACS, and my interpretation follows:    X-rays right elbow 8/1/2024: No acute osseous abnormality.  No fracture or dislocation.  No significant degenerative changes.  Enthesophytes appreciated about the olecranon and lateral epicondyle.    Past Medical History:   Diagnosis Date    Glaucoma     Hypertension        Past Surgical History:   Procedure Laterality Date    LASIK Bilateral        Allergies   Allergen Reactions    Gramineae Pollens Cough       Current Outpatient Medications on File Prior to Visit   Medication Sig Dispense Refill    hydroCHLOROthiazide 25 mg tablet Take 1 tablet (25 mg total) by mouth daily 90 tablet 1    ibuprofen (MOTRIN) 600 mg tablet Take 1 tablet (600 mg total) by mouth every 6 (six) hours as needed for mild pain 30 tablet 0    lidocaine  (Lidoderm) 5 % Apply 1 patch topically over 12 hours daily Remove & Discard patch within 12 hours or as directed by MD 10 patch 0    lisinopril (ZESTRIL) 40 mg tablet Take 1 tablet (40 mg total) by mouth daily 90 tablet 1    methocarbamol (ROBAXIN) 500 mg tablet Take 1 tablet (500 mg total) by mouth daily at bedtime 20 tablet 0    Netarsudil Dimesylate (Rhopressa) 0.02 % SOLN Apply 1 drop to eye daily 1.5 mL 5    simvastatin (ZOCOR) 20 mg tablet Take 1 tablet (20 mg total) by mouth daily at bedtime 90 tablet 1     No current facility-administered medications on file prior to visit.       Social History     Tobacco Use    Smoking status: Never    Smokeless tobacco: Never   Vaping Use    Vaping status: Never Used   Substance Use Topics    Alcohol use: Never    Drug use: Never       Family History   Problem Relation Age of Onset    Diabetes Mother     Diabetes Maternal Grandmother        Review of Systems     As stated in the HPI. All other systems reviewed and are negative.      Hand/upper extremity injection: R elbow  Procedure Details  Condition:lateral epicondylitis Site: R elbow   Needle size: 22 G  Approach: lateral  Medications administered: 1 mL lidocaine 1 %; 6 mg betamethasone acetate-betamethasone sodium phosphate 6 (3-3) mg/mL  Patient tolerance: patient tolerated the procedure well with no immediate complications  Dressing:  Sterile dressing applied       Hand/upper extremity injection: R ring A1  Procedure Details  Condition:trigger finger Location: ring finger - R ring A1   Needle size: 25 G  Ultrasound guidance: no  Approach: volar  Medications administered: 1 mL lidocaine 1 %; 6 mg betamethasone acetate-betamethasone sodium phosphate 6 (3-3) mg/mL  Patient tolerance: patient tolerated the procedure well with no immediate complications  Dressing:  Sterile dressing applied

## 2024-08-21 ENCOUNTER — EVALUATION (OUTPATIENT)
Dept: PHYSICAL THERAPY | Facility: CLINIC | Age: 49
End: 2024-08-21
Payer: COMMERCIAL

## 2024-08-21 DIAGNOSIS — M77.11 LATERAL EPICONDYLITIS OF RIGHT ELBOW: ICD-10-CM

## 2024-08-21 PROCEDURE — 97110 THERAPEUTIC EXERCISES: CPT

## 2024-08-21 PROCEDURE — 97161 PT EVAL LOW COMPLEX 20 MIN: CPT

## 2024-08-21 NOTE — PROGRESS NOTES
PT Evaluation     Today's date: 2024  Patient name: Michell Landrum  : 1975  MRN: [id number]  Referring provider: Dwain May P*  Dx:   Encounter Diagnosis     ICD-10-CM    1. Lateral epicondylitis of right elbow  M77.11 Ambulatory Referral to Physical Therapy          Start Time: 0940  Stop Time: 1018  Total time in clinic (min): 38 minutes    Assessment  Impairments: abnormal or restricted ROM, impaired physical strength and pain with function  Symptom irritability: moderate    Assessment details: Michell is a 48 yo male presenting to OP PT secondary  to Right lateral epicondylitis. Pt reports functional limitations as follows weakness in RUE, limiting patient from grasping and lifting . Pt demonstrates postural deficits in sitting, mild elbow supination deficits, and wrist ext/flex ROM deficits. Pt  additionally presents with wrist, elbow, and shoulder strength deficits. Since sx have been minimal pt was given HEP to address deficits, due to high copay, patient will return in 2 weeks if sx return or for HEP updates. Pt is in agreement with POC.   Understanding of Dx/Px/POC: good     Prognosis: good    Goals  Pt will demonstrate [place] with progressive home exercise program to assist with PT carry over and prevent recurrence of symptoms in the future  Pt will demonstrate 20% improvement in FOTO score to increase tolerance to functional return.   Pt will demonstrate 20 deg improvement in elbow/wrist ROM to increase tolerance to reaching overhead, and to decrease shoulder compensation from lack of flexibiltiy with ADLs such dressing/bathing and lifting  Pt will demonstrate 4+/5 in UE strength to allow for improved tolerance to lifting items overhead.   Pt will demonstrate 4+/5 in periscap strength to improve posture in sitting and improve lifting mechanics      Plan  Patient would benefit from: PT eval and skilled physical therapy  Planned modality interventions: low level laser therapy,  manual electrical stimulation, TENS, cryotherapy, unattended electrical stimulation and thermotherapy: hydrocollator packs    Planned therapy interventions: IASTM, manual therapy, massage, joint mobilization, stretching, therapeutic activities, strengthening, therapeutic exercise and neuromuscular re-education    Frequency: 1-2x week  Plan of Care beginning date: 2024  Plan of Care expiration date: 10/10/2024  Treatment plan discussed with: patient        Subjective Evaluation    History of Present Illness  Mechanism of injury: Michell Landrum is a 49 y.o. right-hand-dominant male who reports to OP PT secondary to right elbow. With onset >6 months ago which he reports maybe attributes to moving and lifting boxes and starting job as . He denies any injury or trauma but states the pain began shortly after starting a job as a .  He notes pain in the lateral elbow without any radiation distally.  Pain is worse with repetitive use and lifting. He instability or numbness and tingling. Pain is worse with grasping objects.   Pt reports he received injection 2-3 weeks ago and this has helped with pain sig.          Recurrent probem    Quality of life: good    Patient Goals  Patient goals for therapy: increased strength    Pain  Current pain ratin  At best pain ratin  At worst pain ratin  Quality: discomfort  Relieving factors: rest and medications  Aggravating factors: lifting  Progression: improved    Social Support  Stairs in house: no   Lives with: alone    Employment status: working  Hand dominance: right      Diagnostic Tests  X-ray: normal  Treatments  Current treatment: injection treatment        Objective     Active Range of Motion     Right Elbow   Normal active range of motion  Flexion: WFL  Extension: WFL  Forearm supination: 50 degrees   Forearm pronation: WFL    Right Wrist   Wrist flexion: 50 degrees   Wrist extension: 60 degrees   Radial deviation: 20 degrees   Ulnar deviation:  50 degrees     Passive Range of Motion     Right Wrist   Wrist flexion: 70 degrees   Wrist extension: 60 degrees   Radial deviation: 20 degrees   Ulnar deviation: 60 degrees     Strength/Myotome Testing     Right Shoulder     Planes of Motion   Flexion: 4   Abduction: 4   External rotation at 0°: 3+   External rotation at 90°: 3+   Internal rotation at 0°: 4-   Internal rotation at 90°: 4-     Right Elbow   Flexion: 4  Extension: 4-  Forearm supination: 4  Forearm pronation: 4    Left Wrist/Hand      (2nd hand position)     Trial 1: 23    Trial 2: 25    Trial 3: 25    Average: 24.33    Right Wrist/Hand   Wrist extension: 4-  Wrist flexion: 4-  Radial deviation: 4+  Ulnar deviation: 4-     (2nd hand position)     Trial 1: 30    Trial 2: 28    Trial 3: 27    Average: 28.33    Tests     Right Elbow   Positive elbow flexion.              Precautions:   Past Medical History:   Diagnosis Date    Glaucoma     Hypertension      Date 8/21           Visit # 1           FOTO done           Re-eval              Taryn copay, may return in two weeks for update of HEP** make sure to add shoulder strengthening see below**      Manuals 8/21            Elbow mobilization prn                                                     Neuro Re-Ed             Prone Ys, ts, Ws NV            Serratus press NV                                                                             Ther Ex             Wrist flex/ext str 3x15''            Wrist flex/ext strengthening 2x10 GTB             strengthening  NV towel for HEP            No money  GTB 2x10            Elbow flexion  GTB 2x10             Tricep extension NV                                      Ther Activity                                       Gait Training                                       Modalities

## 2024-08-25 DIAGNOSIS — I10 PRIMARY HYPERTENSION: ICD-10-CM

## 2024-08-25 RX ORDER — HYDROCHLOROTHIAZIDE 25 MG/1
25 TABLET ORAL DAILY
Qty: 90 TABLET | Refills: 1 | Status: SHIPPED | OUTPATIENT
Start: 2024-08-25

## 2024-08-25 RX ORDER — LISINOPRIL 40 MG/1
40 TABLET ORAL DAILY
Qty: 90 TABLET | Refills: 1 | Status: SHIPPED | OUTPATIENT
Start: 2024-08-25

## 2024-09-08 DIAGNOSIS — E78.5 HYPERLIPIDEMIA, UNSPECIFIED HYPERLIPIDEMIA TYPE: ICD-10-CM

## 2024-09-09 RX ORDER — SIMVASTATIN 20 MG
20 TABLET ORAL
Qty: 90 TABLET | Refills: 1 | Status: SHIPPED | OUTPATIENT
Start: 2024-09-09

## 2024-10-28 ENCOUNTER — IMMUNIZATIONS (OUTPATIENT)
Dept: FAMILY MEDICINE CLINIC | Facility: CLINIC | Age: 49
End: 2024-10-28
Payer: COMMERCIAL

## 2024-10-28 DIAGNOSIS — Z23 ENCOUNTER FOR IMMUNIZATION: Primary | ICD-10-CM

## 2024-10-28 PROCEDURE — 90673 RIV3 VACCINE NO PRESERV IM: CPT

## 2024-10-28 PROCEDURE — 90471 IMMUNIZATION ADMIN: CPT

## 2024-11-01 ENCOUNTER — OFFICE VISIT (OUTPATIENT)
Age: 49
End: 2024-11-01
Payer: COMMERCIAL

## 2024-11-01 VITALS
WEIGHT: 230.2 LBS | HEIGHT: 64 IN | SYSTOLIC BLOOD PRESSURE: 110 MMHG | DIASTOLIC BLOOD PRESSURE: 72 MMHG | BODY MASS INDEX: 39.3 KG/M2

## 2024-11-01 DIAGNOSIS — M65.341 TRIGGER FINGER, RIGHT RING FINGER: ICD-10-CM

## 2024-11-01 DIAGNOSIS — M77.11 LATERAL EPICONDYLITIS OF RIGHT ELBOW: Primary | ICD-10-CM

## 2024-11-01 PROCEDURE — 99213 OFFICE O/P EST LOW 20 MIN: CPT | Performed by: PHYSICIAN ASSISTANT

## 2024-11-01 RX ORDER — DORZOLAMIDE HYDROCHLORIDE AND TIMOLOL MALEATE 20; 5 MG/ML; MG/ML
SOLUTION/ DROPS OPHTHALMIC
COMMUNITY
Start: 2024-10-22

## 2024-11-01 RX ORDER — LATANOPROST 50 UG/ML
SOLUTION/ DROPS OPHTHALMIC
COMMUNITY
Start: 2024-10-21

## 2024-11-01 RX ORDER — BRIMONIDINE TARTRATE 2 MG/ML
SOLUTION/ DROPS OPHTHALMIC
COMMUNITY
Start: 2024-10-21

## 2024-11-01 NOTE — PROGRESS NOTES
Patient Name:  Michell Landrum  MRN:  [id number]    Assessment & Plan     Improved right elbow lateral epicondylitis.  Resolved right ring finger trigger finger.  Patient notes significant improvement in his right elbow pain after receiving a right elbow common extensor origin corticosteroid injection on 8/1/2024.  He notes some persistent discomfort primarily at work while working as a .  Offered repeat corticosteroid injection into the right elbow chronic since origin.  Patient declined at this time due to his overall improvement.  Recommend patient continue home exercises.  Anti-inflammatories as needed.  Activities as tolerated with modification avoid pain.  Follow-up as needed.    Chief Complaint     Follow-up right elbow pain and right ring finger trigger finger.    History of the Present Illness     Michell Landrum is a 49 y.o. male who returns to the office today for follow-up regarding his right elbow and right ring finger.  He was initially evaluated on 8/1/2024.  At that time he received a right ring finger A1 pulley corticosteroid injection for trigger finger.  After receiving the injection in the right ring finger he notes full resolution of his symptoms.  He no longer notes any pain or clicking or locking in the right ring finger.  He is happy with his progress and result.    He also underwent a right elbow common extensor origin corticosteroid injection at that time as well.  He noted significant improvement from this injection.  He was also referred to physical therapy.  He states he completed 1 session and has been performing home exercises.  He notes continued improvement and only notes mild discomfort in the lateral elbow at this time.  Discomfort is worse while at work.  He works as a .  He denies any weakness or instability.  Currently his pain can reach 6 out of 10.  He currently takes Tylenol on occasion.  No numbness or tingling.  No fevers or chills.  He is happy with his  "progress.    Physical Exam     /72 (BP Location: Left arm, Patient Position: Sitting, Cuff Size: Large)   Ht 5' 4\" (1.626 m)   Wt 104 kg (230 lb 3.2 oz)   BMI 39.51 kg/m²     Right elbow: No gross deformity.  Skin intact without erythema ecchymosis or swelling.  Minimal tenderness lateral epicondyle.  No tenderness medial epicondyle and olecranon.  Full elbow flexion and extension without pain.  Full pronation and supination of the wrist without pain in the elbow.  Slight discomfort in the lateral elbow with resisted wrist extension.  No pain in the elbow with resisted wrist flexion.  Elbow is stable to varus and valgus stress.  Sensation intact distally.  2+ radial pulse.    Eyes: Anicteric sclerae.  ENT: Trachea midline.  Lungs: Normal respiratory effort.  CV: Capillary refill is less than 2 seconds.  Skin: Intact without erythema.  Lymph: No palpable lymphadenopathy.  Neuro: Sensation is grossly intact to light touch.  Psych: Mood and affect are appropriate.      Past Medical History:   Diagnosis Date    Glaucoma     Hypertension        Past Surgical History:   Procedure Laterality Date    LASIK Bilateral        Allergies   Allergen Reactions    Gramineae Pollens Cough       Current Outpatient Medications on File Prior to Visit   Medication Sig Dispense Refill    brimonidine tartrate 0.2 % ophthalmic solution       dorzolamide-timolol (COSOPT) 2-0.5 % ophthalmic solution       hydroCHLOROthiazide 25 mg tablet Take 1 tablet (25 mg total) by mouth daily 90 tablet 1    ibuprofen (MOTRIN) 600 mg tablet Take 1 tablet (600 mg total) by mouth every 6 (six) hours as needed for mild pain 30 tablet 0    latanoprost (XALATAN) 0.005 % ophthalmic solution       lidocaine (Lidoderm) 5 % Apply 1 patch topically over 12 hours daily Remove & Discard patch within 12 hours or as directed by MD 10 patch 0    lisinopril (ZESTRIL) 40 mg tablet Take 1 tablet (40 mg total) by mouth daily 90 tablet 1    meloxicam (Mobic) 15 mg " tablet Take 1 tablet (15 mg total) by mouth daily 30 tablet 0    methocarbamol (ROBAXIN) 500 mg tablet Take 1 tablet (500 mg total) by mouth daily at bedtime 20 tablet 0    Netarsudil Dimesylate (Rhopressa) 0.02 % SOLN Apply 1 drop to eye daily 1.5 mL 5    simvastatin (ZOCOR) 20 mg tablet Take 1 tablet (20 mg total) by mouth daily at bedtime 90 tablet 1     No current facility-administered medications on file prior to visit.       Social History     Tobacco Use    Smoking status: Never    Smokeless tobacco: Never   Vaping Use    Vaping status: Never Used   Substance Use Topics    Alcohol use: Never    Drug use: Never       Family History   Problem Relation Age of Onset    Diabetes Mother     Diabetes Maternal Grandmother        Review of Systems     As stated in the HPI. All other systems reviewed and are negative.

## 2024-11-02 DIAGNOSIS — I10 PRIMARY HYPERTENSION: ICD-10-CM

## 2024-11-04 RX ORDER — LISINOPRIL 40 MG/1
40 TABLET ORAL DAILY
Qty: 90 TABLET | Refills: 1 | Status: SHIPPED | OUTPATIENT
Start: 2024-11-04

## 2025-01-28 ENCOUNTER — OFFICE VISIT (OUTPATIENT)
Dept: FAMILY MEDICINE CLINIC | Facility: CLINIC | Age: 50
End: 2025-01-28
Payer: COMMERCIAL

## 2025-01-28 VITALS
BODY MASS INDEX: 40.05 KG/M2 | RESPIRATION RATE: 20 BRPM | HEIGHT: 64 IN | WEIGHT: 234.6 LBS | TEMPERATURE: 97.8 F | HEART RATE: 87 BPM | OXYGEN SATURATION: 100 % | DIASTOLIC BLOOD PRESSURE: 78 MMHG | SYSTOLIC BLOOD PRESSURE: 128 MMHG

## 2025-01-28 DIAGNOSIS — E78.5 HYPERLIPIDEMIA, UNSPECIFIED HYPERLIPIDEMIA TYPE: ICD-10-CM

## 2025-01-28 DIAGNOSIS — R53.83 OTHER FATIGUE: Primary | ICD-10-CM

## 2025-01-28 DIAGNOSIS — Z12.5 SCREENING FOR PROSTATE CANCER: ICD-10-CM

## 2025-01-28 DIAGNOSIS — I10 PRIMARY HYPERTENSION: ICD-10-CM

## 2025-01-28 DIAGNOSIS — G57.12 MERALGIA PARAESTHETICA, LEFT: ICD-10-CM

## 2025-01-28 PROCEDURE — 99214 OFFICE O/P EST MOD 30 MIN: CPT | Performed by: FAMILY MEDICINE

## 2025-01-28 NOTE — ASSESSMENT & PLAN NOTE
Well controlled, check labs follow up 6 months  Orders:    Comprehensive metabolic panel; Future    Ambulatory Referral to Sleep Medicine; Future

## 2025-01-28 NOTE — PROGRESS NOTES
"Name: Michell Landrum      : 1975      MRN: [id number]  Encounter Provider: Bobby Stockton MD  Encounter Date: 2025   Encounter department: [facility]  :  Assessment & Plan  Primary hypertension    Well controlled, check labs follow up 6 months  Orders:    Comprehensive metabolic panel; Future    Ambulatory Referral to Sleep Medicine; Future    Hyperlipidemia, unspecified hyperlipidemia type    Check lipid panel continue medication    Orders:    Lipid panel; Future    Screening for prostate cancer    Orders:    PSA, Total Screen; Future    Other fatigue    Has been referred to sleep medicine in the past by previous PCP, patient states insurance denied sleep study, reports fatigue, snoring and htn.    Orders:    Ambulatory Referral to Sleep Medicine; Future    Meralgia paraesthetica, left    Advised loose clothes/belt weight loss.              History of Present Illness   HPI    49 year old male with pmh of htn and hld presenting in follow up.    Overall doing well since last viist.    Does have some numbness in the left thigh, does not think it is to bothersome.    No side effects of medications.    He has had pain with movement in his left breast, in the mornings, he does not have any pain or tenderness today.    States it feels like a sore muscle.    Review of Systems   Constitutional:  Negative for activity change and appetite change.   Respiratory:  Negative for apnea and chest tightness.    Gastrointestinal:  Negative for abdominal distention and abdominal pain.   Musculoskeletal:  Negative for arthralgias and back pain.   Neurological:  Negative for dizziness and facial asymmetry.       Objective   /78 (BP Location: Left arm, Patient Position: Sitting, Cuff Size: Large)   Pulse 87   Temp 97.8 °F (36.6 °C) (Tympanic)   Resp 20   Ht 5' 4\" (1.626 m)   Wt 106 kg (234 lb 9.6 oz)   SpO2 100%   BMI 40.27 kg/m²      Physical Exam  Vitals and nursing note " reviewed.   Constitutional:       General: He is not in acute distress.     Appearance: He is well-developed.   HENT:      Head: Normocephalic and atraumatic.   Eyes:      Conjunctiva/sclera: Conjunctivae normal.   Cardiovascular:      Rate and Rhythm: Normal rate and regular rhythm.      Heart sounds: No murmur heard.  Pulmonary:      Effort: Pulmonary effort is normal. No respiratory distress.      Breath sounds: Normal breath sounds.   Abdominal:      Palpations: Abdomen is soft.      Tenderness: There is no abdominal tenderness.   Musculoskeletal:         General: No swelling.      Cervical back: Neck supple.   Skin:     General: Skin is warm and dry.      Capillary Refill: Capillary refill takes less than 2 seconds.   Neurological:      Mental Status: He is alert.   Psychiatric:         Mood and Affect: Mood normal.

## 2025-01-30 RX ORDER — DORZOLAMIDE HYDROCHLORIDE AND TIMOLOL MALEATE 20; 5 MG/ML; MG/ML
SOLUTION/ DROPS OPHTHALMIC
Refills: 0 | OUTPATIENT
Start: 2025-01-30

## 2025-01-30 RX ORDER — BRIMONIDINE TARTRATE 2 MG/ML
SOLUTION/ DROPS OPHTHALMIC
Refills: 0 | OUTPATIENT
Start: 2025-01-30

## 2025-02-23 DIAGNOSIS — I10 PRIMARY HYPERTENSION: ICD-10-CM

## 2025-02-23 RX ORDER — LISINOPRIL 40 MG/1
40 TABLET ORAL DAILY
Qty: 90 TABLET | Refills: 0 | Status: SHIPPED | OUTPATIENT
Start: 2025-02-23

## 2025-02-23 RX ORDER — HYDROCHLOROTHIAZIDE 25 MG/1
25 TABLET ORAL DAILY
Qty: 90 TABLET | Refills: 0 | Status: SHIPPED | OUTPATIENT
Start: 2025-02-23

## 2025-03-07 DIAGNOSIS — E78.5 HYPERLIPIDEMIA, UNSPECIFIED HYPERLIPIDEMIA TYPE: ICD-10-CM

## 2025-03-07 RX ORDER — SIMVASTATIN 20 MG
20 TABLET ORAL
Qty: 90 TABLET | Refills: 1 | Status: SHIPPED | OUTPATIENT
Start: 2025-03-07

## 2025-05-17 DIAGNOSIS — I10 PRIMARY HYPERTENSION: ICD-10-CM

## 2025-05-18 RX ORDER — LISINOPRIL 40 MG/1
40 TABLET ORAL DAILY
Qty: 30 TABLET | Refills: 0 | Status: SHIPPED | OUTPATIENT
Start: 2025-05-18

## 2025-05-18 RX ORDER — HYDROCHLOROTHIAZIDE 25 MG/1
25 TABLET ORAL DAILY
Qty: 30 TABLET | Refills: 0 | Status: SHIPPED | OUTPATIENT
Start: 2025-05-18

## 2025-06-14 DIAGNOSIS — I10 PRIMARY HYPERTENSION: ICD-10-CM

## 2025-06-14 DIAGNOSIS — E78.5 HYPERLIPIDEMIA, UNSPECIFIED HYPERLIPIDEMIA TYPE: ICD-10-CM

## 2025-06-15 RX ORDER — SIMVASTATIN 20 MG
20 TABLET ORAL
Qty: 90 TABLET | Refills: 1 | Status: SHIPPED | OUTPATIENT
Start: 2025-06-15

## 2025-06-16 RX ORDER — LISINOPRIL 40 MG/1
40 TABLET ORAL DAILY
Qty: 30 TABLET | Refills: 1 | Status: SHIPPED | OUTPATIENT
Start: 2025-06-16

## 2025-06-16 RX ORDER — HYDROCHLOROTHIAZIDE 25 MG/1
25 TABLET ORAL DAILY
Qty: 30 TABLET | Refills: 1 | Status: SHIPPED | OUTPATIENT
Start: 2025-06-16

## 2025-06-18 ENCOUNTER — OFFICE VISIT (OUTPATIENT)
Dept: SLEEP CENTER | Facility: CLINIC | Age: 50
End: 2025-06-18
Payer: COMMERCIAL

## 2025-06-18 VITALS
WEIGHT: 241 LBS | OXYGEN SATURATION: 98 % | BODY MASS INDEX: 41.15 KG/M2 | DIASTOLIC BLOOD PRESSURE: 70 MMHG | HEART RATE: 88 BPM | HEIGHT: 64 IN | SYSTOLIC BLOOD PRESSURE: 128 MMHG | RESPIRATION RATE: 18 BRPM

## 2025-06-18 DIAGNOSIS — R06.89 HYPERCAPNIA: ICD-10-CM

## 2025-06-18 DIAGNOSIS — R51.9 MORNING HEADACHE: ICD-10-CM

## 2025-06-18 DIAGNOSIS — E66.813 CLASS 3 SEVERE OBESITY WITH BODY MASS INDEX (BMI) OF 40.0 TO 44.9 IN ADULT, UNSPECIFIED OBESITY TYPE, UNSPECIFIED WHETHER SERIOUS COMORBIDITY PRESENT: ICD-10-CM

## 2025-06-18 DIAGNOSIS — R53.83 OTHER FATIGUE: ICD-10-CM

## 2025-06-18 DIAGNOSIS — I10 PRIMARY HYPERTENSION: ICD-10-CM

## 2025-06-18 DIAGNOSIS — R06.81 WITNESSED APNEIC SPELLS: Primary | ICD-10-CM

## 2025-06-18 PROCEDURE — 99244 OFF/OP CNSLTJ NEW/EST MOD 40: CPT | Performed by: STUDENT IN AN ORGANIZED HEALTH CARE EDUCATION/TRAINING PROGRAM

## 2025-06-18 NOTE — PROGRESS NOTES
Name: Michell Landrum      : 1975      MRN: [id number]  Encounter Provider: Bobby Molina DO  Encounter Date: 2025   Encounter department: [facility]  :  CONSULTING PROVIDER:  Bobby Stockton MD  [address] [place] [address]   [address]  [place] [address]    Assessment & Plan  Witnessed apneic spells  Michell is a very pleasant 50-year-old gentleman with PMHx of HTN, obesity who presents for possible sleep disordered breathing.  Per his symptomatology and STOP-BANG score, evaluation for and treatment of sleep disordered breathing if present is merited.    Discussed with patient the pathophysiology of OSAS and medical conditions associated with OSAS such as DM, HTN, CAD, Depression, Stroke, Headache.  Treatment options including surgery, Dental appliances, positional therapy, and CPAP were discussed.  I have ordered a split-night polysomnogram to evaluate for sleep-disordered breathing and the most effective PAP pressure setting. Will add TcCO2 monitoring due to the elevated CO2 levels and morning headaches.   Advised patient to avoid activities that could harm self or others when tired/sleepy, including driving.  Discussed importance of good sleep hygiene.  We will reach out to the patient via phone with the results of the sleep study and next steps    Orders:    Split Study w/ Transcutaneous; Future    Primary hypertension    Reviewed the importance of treating SDB on cardiovascular risk reduction (including but not limited to impaired BP control, risk of heart attack, CHF, and both initial occurrence of A-fib and recurrence of A-fib following ablation or similar intervention)  Defer primary management per patient's cardiologist and/or PCP    Orders:    Ambulatory Referral to Sleep Medicine    Split Study w/ Transcutaneous; Future    Class 3 severe obesity with body mass index (BMI) of 40.0 to 44.9 in adult, unspecified obesity type, unspecified whether serious  "comorbidity present    Discussed the importance of maintaining a healthy weight on SDB control/management, and vice versa.  Encouraged lifestyle practices to maintain a healthy weight (including diet, exercise).  Reviewed that if patient ever wishes for a formal referral to Weight Management, they can let myself or their PCP know    Orders:    Split Study w/ Transcutaneous; Future    Other fatigue  See witnessed apneic spells    Orders:    Ambulatory Referral to Sleep Medicine    Split Study w/ Transcutaneous; Future    Hypercapnia  See witnessed apneic spells  Orders:    Split Study w/ Transcutaneous; Future    Morning headache  See witnessed apneic spells  Orders:    Split Study w/ Transcutaneous; Future        Follow-up:  He will Return for Follow-up pending results of sleep study..    Thank you for allowing me to participate in the care of your patient.  If there are any questions regarding evaluation please feel free to reach out.       ________________________________________________________________________________________________    Subjective:     HPI: Michell Landrum is a 50 y.o. male with PMHx of HTN, obesity who presents for possible sleep disordered breathing.    Offered  services, however he declined and wished to converse in [employer].    He states he is here because his wife has told him that he stops breathing during the night and snores, to the point that she has to move him to get him to start breathing again.     [employer] also flagged and told him he may have BRADLEY.       STOP-BANG:    -Loud Snoring: Yes = 1  -Feels Tired, fatigued, or sleepy during the daytime: Yes = 1  -Observed/witnessed apneic events: Yes = 1  -Have or being treated for HTN: Yes = 1  -BMI: >35 kg/m² = 1  -Age: >50 years old = 1  -Neck Circumference: Male: < or equal to 17 inches = 0   -Sex*: Male = 1    Total: 7      *Listed as \"gender\" per original STOP-BANG " "documentation/criteria      https://www.mdcalc.com/calc/3992/stop-bang-score-obstructive-sleep-apnea#evidence          SLEEP-WAKE SCHEDULE  Sleep Schedule:  Weekdays:  Bedtime: 3417-2798   Asleep in 16-30 minutes   Nighttime awakenings: 2-3    Wake: 0630 - sometimes wakes up at 0445/0500 and prays for a time before going back to bed. After dropping wife off at work, will go back to bed until 0830.   -Sometimes feels tired/groggy when he awakens   -Sometimes feels better if he wakes later/gets more sleep.     Naps: 0    Average total sleep time (in a 24 hour period): ~7 hours.    Weekends:  Bedtime:2300   Asleep in 16-30 minutes   Nighttime awakenings: 2-3     Wake: 0900    Naps: Sometimes in the afternoon    Average total sleep time (in a 24 hour period): ~9-10 hours.    Sleep-Related Details:  Preferred Sleep Position: supine and side(s)  SDB Symptoms: snoring, witnessed apneas, gasping/choking, morning headaches, and waking unrefreshed  Sleep-Related Hallucinations: No   Sleep Paralysis: No         Parasomnias:  He denies any parasomnia activity.    Wake-Related Details:  Daytime Sleepiness: Yes, \"sometimes,\" moreso after lunch   Work Schedule: 4427-2339,  at a [employer]  Alcohol Use: No  Substance Use: No  Tobacco Use: No      PAST TREATMENTS:  -None    PRIOR SLEEP STUDIES:  -None    OTHER RELEVANT LABS AND STUDIES:  CO2 levels:  -4/26/2024: 31  -2/15/2023: 26  -1/26/2023: 28      Sitting and reading: Would never doze  Watching TV: Slight chance of dozing  Sitting, inactive in a public place (e.g. a theatre or a meeting): Moderate chance of dozing  As a passenger in a car for an hour without a break: Would never doze  Lying down to rest in the afternoon when circumstances permit: Moderate chance of dozing  Sitting and talking to someone: Would never doze  Sitting quietly after a lunch without alcohol: Slight chance of dozing  In a car, while stopped for a few minutes in traffic: Would never doze  Total score: " "6     Review of Systems  Pertinent positives/negatives included in HPI and also as noted:       Objective   /70   Pulse 88   Resp 18   Ht 5' 4\" (1.626 m)   Wt 109 kg (241 lb)   SpO2 98%   BMI 41.37 kg/m²     Neck Circumference: 17  Physical Exam  Visit Vitals  /70   Pulse 88   Resp 18   Ht 5' 4\" (1.626 m)   Wt 109 kg (241 lb)   SpO2 98%   BMI 41.37 kg/m²   Smoking Status Never   BSA 2.12 m²       PHYSICAL EXAMINATION:  Vital Signs:  /70   Pulse 88   Resp 18   Ht 5' 4\" (1.626 m)   Wt 109 kg (241 lb)   SpO2 98%   BMI 41.37 kg/m²  Body mass index is 41.37 kg/m².    Constitutional: NAD, well appearing   Mental Status: AAOx3  Neck Circumference: Neck Circumference: 17 inches  Skin: Warm, dry, no rashes noted   Eyes: PERRL, normal conjunctiva  ENT: Nasal congestion absent  Posterior Airspace:   Echevarria Tongue Position: 4  Retrognathia: absent  Overbite: absent  High Arched Palate: absent  Tongue Scalloping/Ridging: present  Uvula: enlarged  Chest: No evidence of respiratory distress, no accessory muscle use; no evidence of peripheral cyanosis  Abdomen: Soft, NT/ND  Extremities: No digital clubbing or pedal edema    NEUROLOGICAL EXAM:  General: Awake, alert, speech fluent, comprehension, naming, repetition intact. Short and long term memory intact.  CN: PERRL, EOMI without nystagmus, facial sensation and strength are normal and symmetric, hearing is intact to conversational tone, palate and tongue movements are intact and symmetric.  Motor: Normal tone, bulk and strength bilaterally.   Sensation: LT intact throughout.  Gait: Able to walk without difficulty. Stance normal.      Data  No results found for: \"HGB\", \"HCT\", \"MCV\"   Lab Results   Component Value Date    CALCIUM 9.3 04/26/2024    K 3.8 04/26/2024    CO2 31 04/26/2024     04/26/2024    BUN 20 04/26/2024    CREATININE 1.00 04/26/2024     No results found for: \"IRON\", \"TIBC\", \"FERRITIN\"  Lab Results   Component Value Date    AST " 22 04/26/2024    ALT 38 04/26/2024       Administrative Statements   I have spent a total time of 40-45 minutes in caring for this patient on the day of the visit/encounter including Diagnostic results, Prognosis, Risks and benefits of tx options, Instructions for management, Patient and family education, Importance of tx compliance, Risk factor reductions, Documenting in the medical record, Reviewing/placing orders in the medical record (including tests, medications, and/or procedures), and Obtaining or reviewing history  .    Electronically signed by:    Bobby Molina DO  Board-Certified Neurology and Sleep Medicine  [facility]  06/18/25

## 2025-06-18 NOTE — ASSESSMENT & PLAN NOTE
Reviewed the importance of treating SDB on cardiovascular risk reduction (including but not limited to impaired BP control, risk of heart attack, CHF, and both initial occurrence of A-fib and recurrence of A-fib following ablation or similar intervention)  Defer primary management per patient's cardiologist and/or PCP    Orders:    Ambulatory Referral to Sleep Medicine    Split Study w/ Transcutaneous; Future

## 2025-06-18 NOTE — PATIENT INSTRUCTIONS
BRADLEY Evaluation:    I suspect you may have sleep apnea.  Sleep apnea is a serious sleep disorder that occurs when a person's breathing is interrupted during sleep. People with untreated sleep apnea stop breathing repeatedly during their sleep, sometimes hundreds of times during the night.  The most common type of sleep apnea is obstructive sleep apnea.  If left untreated, obstructive sleep apnea can result in a number of health problems including hypertension, stroke, arrhythmias, cardiomyopathy (enlargement of the muscle tissue of the heart), heart failure, diabetes, obesity, and heart attacks. It has also been found to make chronic medical conditions (such as high blood pressure, migraine headaches, and seizures (more difficult to manage.  Treatment options for obstructive sleep apnea may include positive airway pressure (PAP) therapy, oral appliance, hypoglossal nerve stimulator, nose/throat surgery, weight loss, side sleeping, or avoidance of medications or substances that can relax the airway muscles (alcohol, benzodiazepines, and opioids).  I have ordered an in-lab polysomnogram (PSG) to evaluate for sleep apnea.  This test should be scheduled at your earliest convenience.   Sleep Clinic: This should be scheduled at the  before you leave today.  Avoid driving if drowsy. We recommend that if you are dozing off while driving, that you do not drive until your sleepiness is appropriately treated.  We encourage a healthy lifestyle with adequate sleep (7-9 hours per night), diet and exercise.  Recommend good sleep hygiene, as outlined below    Myself and/or my team will reach out to you via MyChart and/or phone call with the results and next steps.      Good Sleep Hygiene  Wake up at the same time every day, even on the weekends.  Use your bed for sleep and intimacy only.  If you have been in bed awake for 30 minutes, get up and leave the bedroom. Choose a dull activity not involving a blue screen (TV,  computer, handheld devices). Go back to bed when you feel sleepy.  Avoid caffeine, nicotine and alcohol before you go to bed.  Avoid large meals before you go to bed.  Avoid using screens (computers, tablets, smartphones, etc.) for at least 1 hour before bedtime  Exercise regularly, but do not exercise right before you go to bed.  Avoid daytime naps. If you do take a nap, sleep for 20-40 minutes, and not after 2pm.

## 2025-07-02 ENCOUNTER — OFFICE VISIT (OUTPATIENT)
Dept: URGENT CARE | Age: 50
End: 2025-07-02
Payer: COMMERCIAL

## 2025-07-02 VITALS
BODY MASS INDEX: 41.37 KG/M2 | RESPIRATION RATE: 18 BRPM | TEMPERATURE: 98.7 F | DIASTOLIC BLOOD PRESSURE: 80 MMHG | SYSTOLIC BLOOD PRESSURE: 138 MMHG | WEIGHT: 241 LBS | HEART RATE: 84 BPM | OXYGEN SATURATION: 97 %

## 2025-07-02 DIAGNOSIS — S39.011A STRAIN OF ABDOMINAL MUSCLE, INITIAL ENCOUNTER: Primary | ICD-10-CM

## 2025-07-02 PROCEDURE — 99213 OFFICE O/P EST LOW 20 MIN: CPT | Performed by: STUDENT IN AN ORGANIZED HEALTH CARE EDUCATION/TRAINING PROGRAM

## 2025-07-02 NOTE — LETTER
July 2, 2025     Patient: Michell Landrum   YOB: 1975   Date of Visit: 7/2/2025       To Whom It May Concern:    It is my medical opinion that Michell Landrum should be excused from work on 7/2/25.    If you have any questions or concerns, please don't hesitate to call.         Sincerely,        Ramos Neri, DO    CC: No Recipients

## 2025-07-02 NOTE — PROGRESS NOTES
[facility]        NAME: Michell Landrum is a 50 y.o. male  : 1975    MRN: [id number]  DATE: 2025  TIME: 6:08 PM    Assessment and Plan   Strain of abdominal muscle, initial encounter [S39.011A]  1. Strain of abdominal muscle, initial encounter          Patient with history and exam consistent with oblique strain.  Will treat with oral anti-inflammatory medicine, heating pad, avoiding aggravating activity.  Follow-up with PCP.  Go to ER symptoms worsen.    Patient Instructions       Follow up with PCP in 3-5 days.  Proceed to  ER if symptoms worsen.    If tests have been performed at [facility], our office will contact you with results if changes need to be made to the care plan discussed with you at the visit.  [facility]    Chief Complaint     Chief Complaint   Patient presents with    Back Pain     Pt states he jumped quickly out of bed during the night and immediately felt pain on right side.  Attempted Tylenol without relief.           History of Present Illness       Patient presents valuation of injury to his right torso.  States he was getting out of bed quickly and felt a sharp pull in the right oblique area.  Pain is mild to moderate in intensity.  Is worse with twisting and side bending.  Denies any bruising or swelling numbness or tingling.        Review of Systems   Review of Systems    As stated in HPI      Current Medications     Current Medications[1]    Current Allergies     Allergies as of 2025 - Reviewed 2025   Allergen Reaction Noted    Gramineae pollens Cough 2023            The following portions of the patient's history were reviewed and updated as appropriate: allergies, current medications, past family history, past medical history, past social history, past surgical history and problem list.     Past Medical History[2]    Past Surgical History[3]    Family History[4]      Medications have been  verified.        Objective   /80   Pulse 84   Temp 98.7 °F (37.1 °C)   Resp 18   Wt 109 kg (241 lb)   SpO2 97%   BMI 41.37 kg/m²   No LMP for male patient.       Physical Exam     Physical Exam  Constitutional:       General: He is not in acute distress.     Appearance: He is well-developed. He is not toxic-appearing.   HENT:      Head: Normocephalic and atraumatic.      Right Ear: External ear normal.      Left Ear: External ear normal.     Eyes:      Extraocular Movements: Extraocular movements intact.       Cardiovascular:      Rate and Rhythm: Normal rate.   Pulmonary:      Effort: Pulmonary effort is normal.   Abdominal:     Neurological:      General: No focal deficit present.      Mental Status: He is alert and oriented to person, place, and time.     Psychiatric:         Mood and Affect: Mood normal.                        [1]   Current Outpatient Medications:     brimonidine tartrate 0.2 % ophthalmic solution, , Disp: , Rfl:     dorzolamide-timolol (COSOPT) 2-0.5 % ophthalmic solution, , Disp: , Rfl:     hydroCHLOROthiazide 25 mg tablet, Take 1 tablet (25 mg total) by mouth daily, Disp: 30 tablet, Rfl: 1    ibuprofen (MOTRIN) 600 mg tablet, Take 1 tablet (600 mg total) by mouth every 6 (six) hours as needed for mild pain, Disp: 30 tablet, Rfl: 0    latanoprost (XALATAN) 0.005 % ophthalmic solution, , Disp: , Rfl:     lidocaine (Lidoderm) 5 %, Apply 1 patch topically over 12 hours daily Remove & Discard patch within 12 hours or as directed by MD, Disp: 10 patch, Rfl: 0    meloxicam (Mobic) 15 mg tablet, Take 1 tablet (15 mg total) by mouth daily, Disp: 30 tablet, Rfl: 0    methocarbamol (ROBAXIN) 500 mg tablet, Take 1 tablet (500 mg total) by mouth daily at bedtime, Disp: 20 tablet, Rfl: 0    simvastatin (ZOCOR) 20 mg tablet, Take 1 tablet (20 mg total) by mouth daily at bedtime, Disp: 90 tablet, Rfl: 1    lisinopril (ZESTRIL) 40 mg tablet, Take 1 tablet (40 mg total) by mouth daily, Disp: 30  tablet, Rfl: 1  [2]   Past Medical History:  Diagnosis Date    Glaucoma     Hypertension    [3]   Past Surgical History:  Procedure Laterality Date    LASIK Bilateral    [4]   Family History  Problem Relation Name Age of Onset    Diabetes Mother      Diabetes Maternal Grandmother

## 2025-07-02 NOTE — PATIENT INSTRUCTIONS
Take tylenol for pain  Heating pad as needed  Avoid activity that hurts  Fu with PCP  Go to ED if pain worsens